# Patient Record
(demographics unavailable — no encounter records)

---

## 2024-11-05 NOTE — HISTORY OF PRESENT ILLNESS
[Regular Cycle Intervals] : periods have been regular [Frequency: Q ___ days] : menstrual periods occur approximately every [unfilled] days [Menarche Age: ____] : age at menarche was [unfilled] [Y] : Patient uses contraception [Condoms] : uses condoms [PGHxTotal] : 5 [[facility]] : 2 [PGHxPremature] : 0 [PGHxAbortions] : 3 [[facility]] : 2 [PGHxABInduced] : 2 [PGHxABSpont] : 1 [PGHxEctopic] : 0 [PGHxMultBirths] : 0

## 2024-11-05 NOTE — PHYSICAL EXAM
[Chaperone Present] : A chaperone was present in the examining room during all aspects of the physical examination [77495] : A chaperone was present during the pelvic exam. [Appropriately responsive] : appropriately responsive [Alert] : alert [No Acute Distress] : no acute distress [Soft] : soft [Non-tender] : non-tender [Non-distended] : non-distended [No HSM] : No HSM [No Lesions] : no lesions [No Mass] : no mass [Oriented x3] : oriented x3 [Examination Of The Breasts] : a normal appearance [No Masses] : no breast masses were palpable [Labia Majora] : normal [Labia Minora] : normal [Normal] : normal [Uterine Adnexae] : normal [No Tenderness] : no tenderness [Nl Sphincter Tone] : normal sphincter tone

## 2025-07-23 NOTE — PLAN
[FreeTextEntry1] : us not contributory check fsh, e2, thyroid today, call w results.  int in hrt for sx.

## 2025-07-23 NOTE — HISTORY OF PRESENT ILLNESS
[FreeTextEntry1] : pt here fto fu us for menorrhaggia and men sx.  last few months periiods have been very light and spotty, but having more night sweats and hot flashes.  didnt get bw.  periods had been very heavy.  us shows nl heterogeneous tuterus, nl adnexa w small cyst, also sl thick endo lining 12.22 mm